# Patient Record
Sex: FEMALE | Race: WHITE
[De-identification: names, ages, dates, MRNs, and addresses within clinical notes are randomized per-mention and may not be internally consistent; named-entity substitution may affect disease eponyms.]

---

## 2020-11-06 ENCOUNTER — HOSPITAL ENCOUNTER (EMERGENCY)
Dept: HOSPITAL 54 - ER | Age: 35
Discharge: HOME | End: 2020-11-06
Payer: COMMERCIAL

## 2020-11-06 VITALS — BODY MASS INDEX: 23.54 KG/M2 | HEIGHT: 67 IN | WEIGHT: 150 LBS

## 2020-11-06 VITALS — SYSTOLIC BLOOD PRESSURE: 112 MMHG | DIASTOLIC BLOOD PRESSURE: 68 MMHG

## 2020-11-06 DIAGNOSIS — R10.31: Primary | ICD-10-CM

## 2020-11-06 DIAGNOSIS — Z79.899: ICD-10-CM

## 2020-11-06 LAB
ALBUMIN SERPL BCP-MCNC: 4 G/DL (ref 3.4–5)
ALP SERPL-CCNC: 74 U/L (ref 46–116)
ALT SERPL W P-5'-P-CCNC: 44 U/L (ref 12–78)
AST SERPL W P-5'-P-CCNC: 28 U/L (ref 15–37)
BASOPHILS # BLD AUTO: 0 /CMM (ref 0–0.2)
BASOPHILS NFR BLD AUTO: 0.7 % (ref 0–2)
BILIRUB DIRECT SERPL-MCNC: 0.1 MG/DL (ref 0–0.2)
BILIRUB SERPL-MCNC: 0.5 MG/DL (ref 0.2–1)
BUN SERPL-MCNC: 8 MG/DL (ref 7–18)
CALCIUM SERPL-MCNC: 8.8 MG/DL (ref 8.5–10.1)
CHLORIDE SERPL-SCNC: 102 MMOL/L (ref 98–107)
CO2 SERPL-SCNC: 27 MMOL/L (ref 21–32)
CREAT SERPL-MCNC: 0.6 MG/DL (ref 0.6–1.3)
EOSINOPHIL NFR BLD AUTO: 0.8 % (ref 0–6)
GLUCOSE SERPL-MCNC: 82 MG/DL (ref 74–106)
HCT VFR BLD AUTO: 44 % (ref 33–45)
HGB BLD-MCNC: 14.6 G/DL (ref 11.5–14.8)
LYMPHOCYTES NFR BLD AUTO: 1.6 /CMM (ref 0.8–4.8)
LYMPHOCYTES NFR BLD AUTO: 28.9 % (ref 20–44)
MCHC RBC AUTO-ENTMCNC: 33 G/DL (ref 31–36)
MCV RBC AUTO: 90 FL (ref 82–100)
MONOCYTES NFR BLD AUTO: 0.4 /CMM (ref 0.1–1.3)
MONOCYTES NFR BLD AUTO: 8.1 % (ref 2–12)
NEUTROPHILS # BLD AUTO: 3.3 /CMM (ref 1.8–8.9)
NEUTROPHILS NFR BLD AUTO: 61.5 % (ref 43–81)
PH UR STRIP: 6.5 [PH] (ref 5–8)
PLATELET # BLD AUTO: 222 /CMM (ref 150–450)
POTASSIUM SERPL-SCNC: 4 MMOL/L (ref 3.5–5.1)
PROT SERPL-MCNC: 7.2 G/DL (ref 6.4–8.2)
RBC # BLD AUTO: 4.87 MIL/UL (ref 4–5.2)
SODIUM SERPL-SCNC: 137 MMOL/L (ref 136–145)
UROBILINOGEN UR STRIP-MCNC: 0.2 EU/DL
WBC NRBC COR # BLD AUTO: 5.4 K/UL (ref 4.3–11)

## 2020-11-06 NOTE — NUR
BIBS FROM HOME TO ER BED 6. AAOX4. NOT IN RESP DISTRESS. AMBULATORY. CAME IN 
FOR R LOWER ABDOMINAL PAIN. PER PT, PAIN STARTED WHEN SHE FINISHED HER PERIOD 
ABOUT 10-13 DAYS AGO. EVER SINCE SHE HAS BEEN EXPERIENCING A DULL-SHARP PAIN 
AND RATES IT AT 6/10. DENIES NAUSEA AND VOMMITING. AWAITING MD FOR EVAL.